# Patient Record
Sex: MALE | Race: WHITE | NOT HISPANIC OR LATINO | Employment: STUDENT | URBAN - METROPOLITAN AREA
[De-identification: names, ages, dates, MRNs, and addresses within clinical notes are randomized per-mention and may not be internally consistent; named-entity substitution may affect disease eponyms.]

---
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
## 2021-04-26 ENCOUNTER — NURSE TRIAGE (OUTPATIENT)
Dept: NURSING | Facility: CLINIC | Age: 21
End: 2021-04-26

## 2021-04-26 NOTE — TELEPHONE ENCOUNTER
Needs shot #1 info to proceed. Lot and date of Moderna shot to proceed with order.    Mercy Gotti RN  [facility] Nurse Advisor

## 2021-05-11 ENCOUNTER — VIRTUAL VISIT (OUTPATIENT)
Dept: URGENT CARE | Facility: CLINIC | Age: 21
End: 2021-05-11
Payer: COMMERCIAL

## 2021-05-11 DIAGNOSIS — Z23 NEED FOR VACCINATION: Primary | ICD-10-CM

## 2021-05-11 PROCEDURE — 99212 OFFICE O/P EST SF 10 MIN: CPT

## 2021-05-20 ENCOUNTER — IMMUNIZATION (OUTPATIENT)
Dept: NURSING | Facility: CLINIC | Age: 21
End: 2021-05-20
Attending: PHYSICIAN ASSISTANT
Payer: COMMERCIAL

## 2021-05-20 DIAGNOSIS — Z23 NEED FOR VACCINATION: ICD-10-CM

## 2021-05-20 PROCEDURE — 91301 PR COVID VAC MODERNA 100 MCG/0.5 ML IM: CPT

## 2021-05-20 PROCEDURE — 0011A PR COVID VAC MODERNA 100 MCG/0.5 ML IM: CPT

## 2021-09-19 ENCOUNTER — HEALTH MAINTENANCE LETTER (OUTPATIENT)
Age: 21
End: 2021-09-19

## 2022-01-08 ENCOUNTER — HEALTH MAINTENANCE LETTER (OUTPATIENT)
Age: 22
End: 2022-01-08

## 2022-05-25 ENCOUNTER — TELEPHONE (OUTPATIENT)
Dept: ENDOCRINOLOGY | Facility: CLINIC | Age: 22
End: 2022-05-25
Payer: COMMERCIAL

## 2022-05-25 NOTE — TELEPHONE ENCOUNTER
Requested that the last 2 years of labs be faxed to [facility] per provider's request.    Thank you,  ARTURO Craft

## 2022-05-25 NOTE — PROGRESS NOTES
Pediatric Endocrinology Follow-up Consultation    Patient: Jf Chapman MRN# [id number]   YOB: 2000 Age: 21year 10month old   Date of Visit: May 27, 2022    Dear Dr. Varun Arellano:    I had the pleasure of seeing your patient, Jf Chapman in the Pediatric Endocrinology Clinic, [facility], on May 27, 2022 for a follow-up consultation of Klinefelter syndrome.           Problem list:     Patient Active Problem List    Diagnosis Date Noted     Adolescent idiopathic scoliosis of thoracic region 03/27/2019     Priority: Medium     Other secondary scoliosis, thoracolumbar region 09/21/2018     Priority: Medium     Klinefelter syndrome 12/09/2013     Priority: Medium          HPI:     Jf is a 21 year old male with a past medical history significant for Klinefelter syndrome who comes for a follow-up evaluation.    Jf's last visit was on 6/21/2019 (Dr. Nettles). Jf reports intermittent changes with his energy levels. Reports that he has had 2-3 episodes in the past 6 months where he is full of energy for 1 week, and then on the next week he is drained and exhausted. Denies noticing any palpitations or other signs of hypo or hyperthyroidism. No headaches or vision changes noted during these episodes. No hallucinations noted. Denies any SI or self harm thoughts.    Review of his chart shows that Jf's last lab check (2020) showed testosterone levels that have remained in a normal range and he did not have significant leydig cell or sertoli cell failure at that time.     Jf underwent surgery for a revision with fresh osteoarticular allograft to the lateral femoral condyle in the fall of 2019.    No concerns.  He is not feeling run down or fatigued. No cold intoelrance, no dry skin.  No constipation. No further palpitations. He reports achieving erections and normal libido. Sexually active. No symptoms of hypothyroidism.  No polyuria/polydipsia.  No  "fractures.  Taking Sertraline and Atarax PRN for anxiety.      Jf had seen Dr. Bo in urology in 2019, but at the time Jf was not very interested in any sperm harvesting at the time. Also, because he was underage, they could not proceed with the procedure. Has not seen him since.    History was obtained from patient and patient's mother.          Social History:     Social History     Social History Narrative     Not on file     Jf is attending college. Wants to be a physical therapist.          Family History:   No family history on file.    Family history was reviewed and is unchanged. Refer to the initial note.         Allergies:   No Known Allergies          Medications:     No current outpatient medications on file.          Review of Systems:   Gen: Negative  Eye: Negative  ENT: Negative  Pulmonary:  Negative  Cardio: no chest pains or swellings  Gastrointestinal: No constipation  Hematologic: Negative  Genitourinary: Negative - no polyuria/polydipsia  Musculoskeletal: Negative  Psychiatric: Negative  Neurologic: Negative  Skin: Negative  Endocrine: see HPI.            Physical Exam:   Blood pressure 106/73, pulse 72, height 1.959 m (6' 5.13\"), weight 88 kg (194 lb 0.1 oz).  Growth percentile SmartLinks can only be used for patients less than 20 years old.  Height: 195.9 cm  (75.87\") Facility age limit for growth percentiles is 20 years.  Weight: 88 kg (actual weight), Facility age limit for growth percentiles is 20 years.  BMI: Body mass index is 22.93 kg/m . Facility age limit for growth percentiles is 20 years.      Constitutional: awake, alert, cooperative, no apparent distress  Eyes: Lids and lashes normal, sclera clear, conjunctiva normal  ENT: Normocephalic, without obvious abnormality, OP clear  Neck:  thyroid symmetric, not enlarged and no tenderness, no facial hair  Hematologic / Lymphatic: no cervical lymphadenopathy  Lungs: No increased work of breathing, clear to auscultation bilaterally " with good air entry.  Cardiovascular: Regular rate and rhythm, no murmurs.  Abdomen: No scars, normal bowel sounds, soft, non-distended, non-tender, no masses palpated, no hepatosplenomegaly  Genitourinary:  Silverio V male  Musculoskeletal: There is no redness, warmth, or swelling of the joints.    Neurologic: Normal DTR  Neuropsychiatric: normal  Skin: no lesions, facial hair present        Laboratory results:      Latest Reference Range & Units 07/14/20 00:00   Cholesterol (External) <=200 mg/dL 189 (E)   FSH  21.0 (E)   HDL Cholesterol (External) >=40 mg/dL 29 (L) (E)   LDL-Cholesterol (External) <=100 mg/dL 144 (H) (E)   LH mIU/mL 27.8 (E)   T4 Free ng/dL 1.40 (E)   Testosterone Total (External) ng/dL 540 (E)   Triglycerides (External) <=150 mg/dL 78 (E)   TSH mcU/mL 1.29 (E)   Scan Lab Results (External)  multiple results see scan (E) [1]   (L): Data is abnormally low  (H): Data is abnormally high  (E): External lab result  [1] U Metanephrine         Assessment and Plan:     Jf is a 21 year old male with a past medical history significant for Klinefelter syndrome seen today for a follow-up. Jf appears clinically euthyroid. He does not have any signs or symptoms concerning for diabetes. He has not had evidence for significant leydig cell dysfunction to this point and no other co-morbities. By clinical history, he does not have symptoms of androgen deficiency.  He is due for repeat screening tests as noted below.     Plan:  - Reviewed previous lab results  - Labs as ordered.  - No medications ordered  - Reviewed plans for transfer to adult care; due to his age I would be happy to see him 1 more time before transferring to adult endo. Will place a referral today to get him in the schedule.  - Jf will contact Dr. Bo's office to review options available now that he is an adult.     Orders Placed This Encounter   Procedures     Testosterone total     T4 free     TSH     Luteinizing Hormone Pediatric     FSH      Lipid Profile     Hemoglobin A1c     Vitamin D 25-Hydroxy     Basic metabolic panel     Adult Endocrinology  Referral     Thank you for allowing me to participate in the care of Jf.  Please do not hesitate to call with questions or concerns.    Sincerely,    Amor Portillo MD  Pediatric Endocrinology  [facility]  Phone: [phone]  Fax: [phone]     Total time including review of medical records, history, physical examination, counselling, and coordination of care concerning one or more of the diagnoses listed under the assessment and plan took 30 minutes. I counseled the patient and family about the nature of the condition(s), options of therapy. All parent questions were answered and parent(s) understood and agreed with the plan.     CC    Patient Care Team:  Varun Arellano MD as PCP - General (Pediatrics)  Aaron Whitten MD as MD (Pediatric Surgery)  Jaden Calzada MD as MD (Orthopedics)  Keeley Welsh, RN as Specialty Care Coordinator (Orthopedics)  Mj Nettles MD as MD (Pediatrics)   Copy to patient  EDNA PUGA EDD, YASH  [address] [facility] [id number]

## 2022-05-27 ENCOUNTER — LAB (OUTPATIENT)
Dept: LAB | Facility: CLINIC | Age: 22
End: 2022-05-27
Attending: PEDIATRICS
Payer: COMMERCIAL

## 2022-05-27 ENCOUNTER — OFFICE VISIT (OUTPATIENT)
Dept: PEDIATRICS | Facility: CLINIC | Age: 22
End: 2022-05-27
Attending: PEDIATRICS
Payer: COMMERCIAL

## 2022-05-27 ENCOUNTER — TRANSFERRED RECORDS (OUTPATIENT)
Dept: HEALTH INFORMATION MANAGEMENT | Facility: CLINIC | Age: 22
End: 2022-05-27

## 2022-05-27 VITALS
BODY MASS INDEX: 22.91 KG/M2 | WEIGHT: 194 LBS | DIASTOLIC BLOOD PRESSURE: 73 MMHG | HEIGHT: 77 IN | HEART RATE: 72 BPM | SYSTOLIC BLOOD PRESSURE: 106 MMHG

## 2022-05-27 DIAGNOSIS — Z91.89 AT RISK FOR ALTERATION IN ENDOCRINE FUNCTION: ICD-10-CM

## 2022-05-27 DIAGNOSIS — Q98.4 KLINEFELTER SYNDROME: Primary | ICD-10-CM

## 2022-05-27 DIAGNOSIS — Q98.4 KLINEFELTER SYNDROME: ICD-10-CM

## 2022-05-27 LAB
ANION GAP SERPL CALCULATED.3IONS-SCNC: 3 MMOL/L (ref 3–14)
BUN SERPL-MCNC: 13 MG/DL (ref 7–30)
CALCIUM SERPL-MCNC: 9.7 MG/DL (ref 8.5–10.1)
CHLORIDE BLD-SCNC: 103 MMOL/L (ref 94–109)
CHOLEST SERPL-MCNC: 165 MG/DL
CO2 SERPL-SCNC: 31 MMOL/L (ref 20–32)
CREAT SERPL-MCNC: 1.11 MG/DL (ref 0.66–1.25)
DEPRECATED CALCIDIOL+CALCIFEROL SERPL-MC: 78 UG/L (ref 20–75)
FASTING STATUS PATIENT QL REPORTED: YES
FSH SERPL-ACNC: 18.7 IU/L (ref 0.7–10.8)
GFR SERPL CREATININE-BSD FRML MDRD: >90 ML/MIN/1.73M2
GLUCOSE BLD-MCNC: 91 MG/DL (ref 70–99)
HBA1C MFR BLD: 5.1 % (ref 0–5.6)
HDLC SERPL-MCNC: 35 MG/DL
LDLC SERPL CALC-MCNC: 110 MG/DL
NONHDLC SERPL-MCNC: 130 MG/DL
POTASSIUM BLD-SCNC: 3.6 MMOL/L (ref 3.4–5.3)
SODIUM SERPL-SCNC: 137 MMOL/L (ref 133–144)
T4 FREE SERPL-MCNC: 0.76 NG/DL (ref 0.76–1.46)
TRIGL SERPL-MCNC: 101 MG/DL
TSH SERPL DL<=0.005 MIU/L-ACNC: 1.4 MU/L (ref 0.4–4)

## 2022-05-27 PROCEDURE — G0463 HOSPITAL OUTPT CLINIC VISIT: HCPCS

## 2022-05-27 PROCEDURE — 99214 OFFICE O/P EST MOD 30 MIN: CPT | Performed by: PEDIATRICS

## 2022-05-27 PROCEDURE — 82306 VITAMIN D 25 HYDROXY: CPT

## 2022-05-27 PROCEDURE — 83036 HEMOGLOBIN GLYCOSYLATED A1C: CPT

## 2022-05-27 PROCEDURE — 84443 ASSAY THYROID STIM HORMONE: CPT

## 2022-05-27 PROCEDURE — 84439 ASSAY OF FREE THYROXINE: CPT

## 2022-05-27 PROCEDURE — 80048 BASIC METABOLIC PNL TOTAL CA: CPT

## 2022-05-27 PROCEDURE — 83002 ASSAY OF GONADOTROPIN (LH): CPT

## 2022-05-27 PROCEDURE — 80061 LIPID PANEL: CPT

## 2022-05-27 PROCEDURE — 83001 ASSAY OF GONADOTROPIN (FSH): CPT

## 2022-05-27 PROCEDURE — 36415 COLL VENOUS BLD VENIPUNCTURE: CPT

## 2022-05-27 PROCEDURE — 84403 ASSAY OF TOTAL TESTOSTERONE: CPT

## 2022-05-27 RX ORDER — SERTRALINE HYDROCHLORIDE 100 MG/1
100 TABLET, FILM COATED ORAL DAILY
COMMUNITY
Start: 2022-03-22 | End: 2023-05-26

## 2022-05-27 RX ORDER — HYDROXYZINE HYDROCHLORIDE 25 MG/1
25 TABLET, FILM COATED ORAL 3 TIMES DAILY PRN
COMMUNITY
End: 2023-05-26

## 2022-05-27 ASSESSMENT — PAIN SCALES - GENERAL: PAINLEVEL: NO PAIN (0)

## 2022-05-27 NOTE — NURSING NOTE
"Informant-    Jf is accompanied by mother    Reason for Visit-  Klinefelter syndrome    Vitals signs-  /73   Pulse 72   Ht 1.959 m (6' 5.13\")   Wt 88 kg (194 lb 0.1 oz)   BMI 22.93 kg/m      There are concerns about the child's exposure to violence in the home: No    Face to Face time: 5 minutes  Valarie Chappell MA      Peds Outpatient BP  1) Rested for 5 minutes, BP taken on bare arm, patient sitting (or supine for infants) w/ legs uncrossed?   Yes  2) Right arm used?      Yes  3) Arm circumference of largest part of upper arm (in cm): 32  4) BP cuff sized used: Large Adult (32-43cm)   If used different size cuff then what was recommended why? N/A  5) First BP reading:machine   BP Readings from Last 1 Encounters:   05/27/22 106/73      Is reading >90%?No   (90% for <1 years is 90/50)  (90% for >18 years is 140/90)  *If a machine BP is at or above 90% take manual BP  6) Manual BP reading: N/A  7) Other comments: None    Valarie Chappell MA.          "

## 2022-06-01 LAB — TESTOST SERPL-MCNC: 461 NG/DL (ref 240–950)

## 2022-06-08 LAB — LUTROPIN (EXTERNAL): 18 MIU/ML

## 2022-09-07 NOTE — PROGRESS NOTES
Discharge Note    Progress reporting period is from last progress note on 12/26/18 to Jan 30, 2019.    Jf failed to follow up and current status is unknown.  Please see information below for last relevant information on current status.  Patient seen for 10 visits.    SUBJECTIVE  Subjective changes noted by patient:  Has played basketball twice and his knee felt fine.  Played limited time in the games.    .  Current pain level is  .     Previous pain level was   .   Changes in function:  Yes (See Goal flowsheet attached for changes in current functional level)  Adverse reaction to treatment or activity: None    OBJECTIVE  Changes noted in objective findings: Minimal swelling in knee.  Quad strength improving.  SL jumps with obvious weakness.     ASSESSMENT/PLAN  Diagnosis: s/p hardware removal for right knee OCD lesion with ORIF   Updated problem list and treatment plan:     STG/LTGs have been met or progress has been made towards goals:  Yes, please see goal flowsheet for most current information  Assessment of Progress: current status is unknown.    Last current status: Pt is progressing well   Self Management Plans:  HEP  I have re-evaluated this patient and find that the nature, scope, duration and intensity of the therapy is appropriate for the medical condition of the patient.  Jf continues to require the following intervention to meet STG and LTG's:  HEP.    Recommendations:  Discharge with current home program.  Patient to follow up with MD as needed.    Please refer to the daily flowsheet for treatment today, total treatment time and time spent performing 1:1 timed codes.         no

## 2022-11-20 ENCOUNTER — HEALTH MAINTENANCE LETTER (OUTPATIENT)
Age: 22
End: 2022-11-20

## 2023-04-15 ENCOUNTER — HEALTH MAINTENANCE LETTER (OUTPATIENT)
Age: 23
End: 2023-04-15

## 2023-05-26 ENCOUNTER — LAB (OUTPATIENT)
Dept: LAB | Facility: CLINIC | Age: 23
End: 2023-05-26
Attending: PEDIATRICS
Payer: COMMERCIAL

## 2023-05-26 ENCOUNTER — OFFICE VISIT (OUTPATIENT)
Dept: PEDIATRICS | Facility: CLINIC | Age: 23
End: 2023-05-26
Attending: PEDIATRICS
Payer: COMMERCIAL

## 2023-05-26 VITALS
WEIGHT: 197.31 LBS | HEIGHT: 77 IN | RESPIRATION RATE: 22 BRPM | DIASTOLIC BLOOD PRESSURE: 72 MMHG | BODY MASS INDEX: 23.3 KG/M2 | SYSTOLIC BLOOD PRESSURE: 125 MMHG | HEART RATE: 71 BPM

## 2023-05-26 DIAGNOSIS — Q98.4 KLINEFELTER SYNDROME: ICD-10-CM

## 2023-05-26 DIAGNOSIS — Q98.4 KLINEFELTER SYNDROME: Primary | ICD-10-CM

## 2023-05-26 LAB
FSH SERPL IRP2-ACNC: 23.1 MIU/ML (ref 1.5–12.4)
HBA1C MFR BLD: 5.4 %
LH SERPL-ACNC: 27.2 MIU/ML (ref 1.7–8.6)
TSH SERPL DL<=0.005 MIU/L-ACNC: 1.92 UIU/ML (ref 0.3–4.2)

## 2023-05-26 PROCEDURE — 83002 ASSAY OF GONADOTROPIN (LH): CPT

## 2023-05-26 PROCEDURE — 83001 ASSAY OF GONADOTROPIN (FSH): CPT

## 2023-05-26 PROCEDURE — 84443 ASSAY THYROID STIM HORMONE: CPT

## 2023-05-26 PROCEDURE — 84403 ASSAY OF TOTAL TESTOSTERONE: CPT

## 2023-05-26 PROCEDURE — G0463 HOSPITAL OUTPT CLINIC VISIT: HCPCS | Performed by: PEDIATRICS

## 2023-05-26 PROCEDURE — 83036 HEMOGLOBIN GLYCOSYLATED A1C: CPT

## 2023-05-26 PROCEDURE — 99214 OFFICE O/P EST MOD 30 MIN: CPT | Performed by: PEDIATRICS

## 2023-05-26 PROCEDURE — 36415 COLL VENOUS BLD VENIPUNCTURE: CPT

## 2023-05-26 NOTE — LETTER
5/26/2023      RE: Jf Chapman  [address] [facility] [id number]       Pediatric Endocrinology Follow-up Consultation    Patient: Jf Chapman MRN# [id number]   YOB: 2000 Age: 22year 10month old   Date of Visit: May 26, 2023    Dear Dr. Varun Arellano:    I had the pleasure of seeing your patient, Jf Chapman in the Pediatric Endocrinology Clinic, [facility], on May 26, 2023 for a follow-up consultation of Klinefelter syndrome.           Problem list:     Patient Active Problem List    Diagnosis Date Noted     Adolescent idiopathic scoliosis of thoracic region 03/27/2019     Priority: Medium     Other secondary scoliosis, thoracolumbar region 09/21/2018     Priority: Medium     Klinefelter syndrome 12/09/2013     Priority: Medium          HPI:     Jf is a 21 year old male with a past medical history significant for Klinefelter syndrome who comes for a follow-up evaluation.    Jf's last visit was on 6/21/2022 (Dr. Ken).  Feels like his energy levels are lacking somewhat.  Some days feels more tired than others.  Has noted that he is not getting erections in the morning less often.  Libido is there but feels like he gets less spontaneous erections.      Review of his chart shows that Jf's last lab check (2022) showed testosterone levels that have remained in a normal range and he did not have significant leydig cell or sertoli cell failure at that time.     He is lowering his zoloft.  Moved Zoloft from 100 to 50 mg.  He started about two years ago for depression.  Hoping he can wean off.  So far is doing well.  Feels like he has experienced a big improvement from that standpoint.    No diabetes mellitus symptoms. He was taking vitamin D previously but is not anymore.      Jf had seen Dr. Bo in urology in 2019, but at the time Jf was not very interested in any sperm harvesting at the time. Also, because he was underage, they could  "not proceed with the procedure. Has not seen him since.    History was obtained from patient and patient's mother.          Social History:     Social History     Social History Narrative     Not on file     Entering 4th year at [employer] this fall  Plans on attending PT school following graduation.         Family History:   No family history on file.    Family history was reviewed and is unchanged. Refer to the initial note.         Allergies:   No Known Allergies          Medications:     Current Outpatient Medications   Medication Sig Dispense Refill     sertraline (ZOLOFT) 50 MG tablet             Review of Systems:   Gen: Negative  Eye: Negative  ENT: Negative  Pulmonary:  Negative  Cardio: no chest pains or swellings  Gastrointestinal: No constipation  Hematologic: Negative  Genitourinary: Negative - no polyuria/polydipsia  Musculoskeletal: Negative  Psychiatric: Negative  Neurologic: Negative  Skin: Negative  Endocrine: see HPI.            Physical Exam:   Blood pressure 125/72, pulse 71, resp. rate 22, height 1.956 m (6' 5.01\"), weight 89.5 kg (197 lb 5 oz).  Growth %ile SmartLinks can only be used for patients less than 20 years old.  Height: 195.6 cm  (75.87\") Facility age limit for growth %debbi is 20 years.  Weight: 89.5 kg (actual weight), Facility age limit for growth %debbi is 20 years.  BMI: Body mass index is 23.39 kg/m . Facility age limit for growth %debbi is 20 years.      Constitutional: awake, alert, cooperative, no apparent distress  Eyes: Lids and lashes normal, sclera clear, conjunctiva normal  ENT: Normocephalic, without obvious abnormality, OP clear  Neck:  thyroid symmetric, not enlarged and no tenderness  Hematologic / Lymphatic: no cervical lymphadenopathy  Lungs: No increased work of breathing, clear to auscultation bilaterally with good air entry.  Cardiovascular: Regular rate and rhythm, no murmurs.  Abdomen: No scars, normal bowel sounds, soft, non-distended, non-tender, no masses " palpated, no hepatosplenomegaly  Genitourinary:  deferred  Musculoskeletal: There is no redness, warmth, or swelling of the joints.    Neurologic: Normal DTR  Neuropsychiatric: normal  Skin: no lesions, full facial hair present        Laboratory results:      Latest Reference Range & Units 05/27/22 12:16   Sodium 133 - 144 mmol/L 137   Potassium 3.4 - 5.3 mmol/L 3.6   Chloride 94 - 109 mmol/L 103   Carbon Dioxide 20 - 32 mmol/L 31   Urea Nitrogen 7 - 30 mg/dL 13   Creatinine 0.66 - 1.25 mg/dL 1.11   GFR Estimate >60 mL/min/1.73m2 >90   Calcium 8.5 - 10.1 mg/dL 9.7   Anion Gap 3 - 14 mmol/L 3   Cholesterol <200 mg/dL 165   Patient Fasting?  Yes   FSH 0.7 - 10.8 IU/L 18.7 (H)   HDL Cholesterol >=40 mg/dL 35 (L)   Hemoglobin A1C 0.0 - 5.6 % 5.1   LDL Cholesterol Calculated <=100 mg/dL 110 (H)   Non HDL Cholesterol <130 mg/dL 130 (H)   T4 Free 0.76 - 1.46 ng/dL 0.76   Testosterone Total 240 - 950 ng/dL 461   Triglycerides <150 mg/dL 101   TSH 0.40 - 4.00 mU/L 1.40   Vitamin D Deficiency screening 20 - 75 ug/L 78 (H)   Glucose 70 - 99 mg/dL 91   Lutropin (External) miu/ml 18   (H): Data is abnormally high  (L): Data is abnormally low         Assessment and Plan:     Jf is a 22 year old male with a past medical history significant for Klinefelter syndrome without previous evidence for leydig cell failure requiring replacement therapy.  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  I have asked that his labs are repeated along with some additional screening tests.  Given Jf's age, I think it would be appropriate for him to transition to adult endocrinology for follow-up.      Orders Placed This Encounter   Procedures     Testosterone total     Luteinizing Hormone     Follicle stimulating hormone     Hemoglobin A1c     TSH with free T4 reflex     Adult Endocrinology  Referral     Patient Instructions   1.  Labs today  2. Will contact you with results via [place] and whether it makes  sense to consider starting hormone treatment  3. Otherwise, should plan on establishing care with adult endocrinology next summer.  Referral placed.  In the interim, contacct me with any questions, concerns or desire to have labs repeated.    Thank you for allowing me to participate in the care of Jf.  Please do not hesitate to call with questions or concerns.    Sincerely,      Mj Nettles MD    Pager [phone]      CC    Patient Care Team:  Varun Arellano MD as PCP - General (Pediatrics)  Aaron Whitten MD as MD (Pediatric Surgery)  Jaden Calzada MD as MD (Orthopedics)  Keeley Welsh, ARTURO as Specialty Care Coordinator (Orthopedics)  Mj Nettles MD as MD (Pediatrics)  Amor Ornelas MD as Assigned Pediatric Specialist Provider   Copy to patient  EDNA PUGA, YASH  [address] [facility] [id number]             Mj Nettles MD

## 2023-05-26 NOTE — PROGRESS NOTES
Pediatric Endocrinology Follow-up Consultation    Patient: Jf Chapman MRN# [id number]   YOB: 2000 Age: 22year 10month old   Date of Visit: May 26, 2023    Dear Dr. Varun Arellano:    I had the pleasure of seeing your patient, Jf Chapman in the Pediatric Endocrinology Clinic, [facility], on May 26, 2023 for a follow-up consultation of Klinefelter syndrome.           Problem list:     Patient Active Problem List    Diagnosis Date Noted     Adolescent idiopathic scoliosis of thoracic region 03/27/2019     Priority: Medium     Other secondary scoliosis, thoracolumbar region 09/21/2018     Priority: Medium     Klinefelter syndrome 12/09/2013     Priority: Medium          HPI:     Jf is a 21 year old male with a past medical history significant for Klinefelter syndrome who comes for a follow-up evaluation.    Jf's last visit was on 6/21/2022 (Dr. Ken).  Feels like his energy levels are lacking somewhat.  Some days feels more tired than others.  Has noted that he is not getting erections in the morning less often.  Libido is there but feels like he gets less spontaneous erections.      Review of his chart shows that Jf's last lab check (2022) showed testosterone levels that have remained in a normal range and he did not have significant leydig cell or sertoli cell failure at that time.     He is lowering his zoloft.  Moved Zoloft from 100 to 50 mg.  He started about two years ago for depression.  Hoping he can wean off.  So far is doing well.  Feels like he has experienced a big improvement from that standpoint.    No diabetes mellitus symptoms. He was taking vitamin D previously but is not anymore.      Jf had seen Dr. Bo in urology in 2019, but at the time Jf was not very interested in any sperm harvesting at the time. Also, because he was underage, they could not proceed with the procedure. Has not seen him since.    History was obtained  "from patient and patient's mother.          Social History:     Social History     Social History Narrative     Not on file     Entering 4th year at [employer] this fall  Plans on attending PT school following graduation.         Family History:   No family history on file.    Family history was reviewed and is unchanged. Refer to the initial note.         Allergies:   No Known Allergies          Medications:     Current Outpatient Medications   Medication Sig Dispense Refill     sertraline (ZOLOFT) 50 MG tablet             Review of Systems:   Gen: Negative  Eye: Negative  ENT: Negative  Pulmonary:  Negative  Cardio: no chest pains or swellings  Gastrointestinal: No constipation  Hematologic: Negative  Genitourinary: Negative - no polyuria/polydipsia  Musculoskeletal: Negative  Psychiatric: Negative  Neurologic: Negative  Skin: Negative  Endocrine: see HPI.            Physical Exam:   Blood pressure 125/72, pulse 71, resp. rate 22, height 1.956 m (6' 5.01\"), weight 89.5 kg (197 lb 5 oz).  Growth %ile SmartLinks can only be used for patients less than 20 years old.  Height: 195.6 cm  (75.87\") Facility age limit for growth %debbi is 20 years.  Weight: 89.5 kg (actual weight), Facility age limit for growth %debbi is 20 years.  BMI: Body mass index is 23.39 kg/m . Facility age limit for growth %debbi is 20 years.      Constitutional: awake, alert, cooperative, no apparent distress  Eyes: Lids and lashes normal, sclera clear, conjunctiva normal  ENT: Normocephalic, without obvious abnormality, OP clear  Neck:  thyroid symmetric, not enlarged and no tenderness  Hematologic / Lymphatic: no cervical lymphadenopathy  Lungs: No increased work of breathing, clear to auscultation bilaterally with good air entry.  Cardiovascular: Regular rate and rhythm, no murmurs.  Abdomen: No scars, normal bowel sounds, soft, non-distended, non-tender, no masses palpated, no hepatosplenomegaly  Genitourinary:  deferred  Musculoskeletal: " There is no redness, warmth, or swelling of the joints.    Neurologic: Normal DTR  Neuropsychiatric: normal  Skin: no lesions, full facial hair present        Laboratory results:      Latest Reference Range & Units 05/27/22 12:16   Sodium 133 - 144 mmol/L 137   Potassium 3.4 - 5.3 mmol/L 3.6   Chloride 94 - 109 mmol/L 103   Carbon Dioxide 20 - 32 mmol/L 31   Urea Nitrogen 7 - 30 mg/dL 13   Creatinine 0.66 - 1.25 mg/dL 1.11   GFR Estimate >60 mL/min/1.73m2 >90   Calcium 8.5 - 10.1 mg/dL 9.7   Anion Gap 3 - 14 mmol/L 3   Cholesterol <200 mg/dL 165   Patient Fasting?  Yes   FSH 0.7 - 10.8 IU/L 18.7 (H)   HDL Cholesterol >=40 mg/dL 35 (L)   Hemoglobin A1C 0.0 - 5.6 % 5.1   LDL Cholesterol Calculated <=100 mg/dL 110 (H)   Non HDL Cholesterol <130 mg/dL 130 (H)   T4 Free 0.76 - 1.46 ng/dL 0.76   Testosterone Total 240 - 950 ng/dL 461   Triglycerides <150 mg/dL 101   TSH 0.40 - 4.00 mU/L 1.40   Vitamin D Deficiency screening 20 - 75 ug/L 78 (H)   Glucose 70 - 99 mg/dL 91   Lutropin (External) miu/ml 18   (H): Data is abnormally high  (L): Data is abnormally low         Assessment and Plan:     Jf is a 22 year old male with a past medical history significant for Klinefelter syndrome without previous evidence for leydig cell failure requiring replacement therapy.  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  I have asked that his labs are repeated along with some additional screening tests.  Given Jf's age, I think it would be appropriate for him to transition to adult endocrinology for follow-up.      Orders Placed This Encounter   Procedures     Testosterone total     Luteinizing Hormone     Follicle stimulating hormone     Hemoglobin A1c     TSH with free T4 reflex     Adult Endocrinology  Referral     Patient Instructions   1.  Labs today  2. Will contact you with results via [employer] and whether it makes sense to consider starting hormone treatment  3. Otherwise, should plan on  establishing care with adult endocrinology next summer.  Referral placed.  In the interim, contacct me with any questions, concerns or desire to have labs repeated.    Thank you for allowing me to participate in the care of Jf.  Please do not hesitate to call with questions or concerns.    Sincerely,      Mj Nettles MD    Pager [phone]      CC    Patient Care Team:  Varun Arellano MD as PCP - General (Pediatrics)  Aaron Whitten MD as MD (Pediatric Surgery)  Jaden Calzada MD as MD (Orthopedics)  Keeley Welsh, ARTURO as Specialty Care Coordinator (Orthopedics)  Mj Nettles MD as MD (Pediatrics)  Amor Ornelas MD as Assigned Pediatric Specialist Provider   Copy to patient  EDNA PUGA, YASH  [address] [address] [facility] [id number]

## 2023-05-26 NOTE — PATIENT INSTRUCTIONS
Labs today  Will contact you with results via [employer] and whether it makes sense to consider starting hormone treatment  Otherwise, should plan on establishing care with adult endocrinology next summer.  Referral placed.  In the interim, contacct me with any questions, concerns or desire to have labs repeated.

## 2023-05-26 NOTE — NURSING NOTE
"Informant-    Jf is accompanied by mother    Reason for Visit-  Klinefelter syndrome      Vitals signs-  Ht 1.956 m (6' 5.01\")   Wt 89.5 kg (197 lb 5 oz)   BMI 23.39 kg/m      There are concerns about the child's exposure to violence in the home: No    Need Flu Shot: No    Need MyChart: No    Does the patient need any medication refills today? No    Face to Face time: 5 Minutes  Deepa Haque MA      "

## 2023-05-31 LAB — TESTOST SERPL-MCNC: 417 NG/DL (ref 240–950)

## 2023-09-20 NOTE — PROGRESS NOTES
Jf Chapman is a 20 year old male who is being evaluated via a billable telephone/video visit.    What phone number would you like to be contacted at? [phone]  How would you like to obtain your AVS? Mail a copy    Subjective   Jf Chapman is a 20 year old male who presents today via telephone/video for the following health issues  accompanied by his mother:  HPI   Concern - needs order for 2nd covid vaccine  Onset: 2weeks ago  Description:  Received his 1st Moderna covid vaccine dose on 4/17 in [place].  He is due for his 2nd dose on 5/15.  Needs order for 2nd covid dose.  Intensity: none  Progression of Symptoms:  same  Accompanying Signs & Symptoms: No URI symptoms, abdominal pain, n/v, constipation, diarrhea, bloody or black tarry stools.  No fever, chills or sweats.  No loss of smell or taste.  Previous history of similar problem: no other vaccines since then  Precipitating factors:        Worsened by: none  Alleviating factors:        Improved by: none  Therapies tried and outcome:  none     Patient Active Problem List   Diagnosis     Klinefelter syndrome     Other secondary scoliosis, thoracolumbar region     Adolescent idiopathic scoliosis of thoracic region     No current outpatient medications on file.     No current facility-administered medications for this visit.      No Known Allergies    Review of Systems   CONSTITUTIONAL: NEGATIVE for fever, chills, change in weight  INTEGUMENTARY/SKIN: NEGATIVE for worrisome rashes, moles or lesions  EYES: NEGATIVE for vision changes or irritation  CV: NEGATIVE for chest pain, palpitations or peripheral edema  GI: NEGATIVE for nausea, abdominal pain, heartburn, or change in bowel habits  MUSCULOSKELETAL: NEGATIVE for significant arthralgias or myalgia  NEURO: NEGATIVE for weakness, dizziness or paresthesias      Objective    Vitals:  No vitals were obtained today due to virtual visit.  Physical Exam   healthy, alert, no distress, and cooperative  PSYCH:  Alert and oriented times 3; coherent speech, normal   rate and volume, able to articulate logical thoughts, able   to abstract reason, no tangential thoughts, no hallucinations   or delusions  Her affect is normal and pleasant  RESP: No cough, no audible wheezing, able to talk in full sentences  Remainder of exam unable to be completed due to telephone visits      Assessment/Plan:  Need for vaccination:  [place] 1st Moderna dose in [place] on 4/17 and will send for his 2nd dose on 5/15.  No contraindications to vaccination at this time.  Recheck in clinic if symptoms worsen or if symptoms do not improve.   -     MODERNA COVID-19 VACCINE 2ND DOSE APPT; Future          Arlin See VIVIANA Aranda      Phone call duration: 10 minutes     coffee/tea/pop/soda

## 2024-01-23 ENCOUNTER — HOSPITAL ENCOUNTER (OUTPATIENT)
Dept: CARDIOLOGY | Facility: CLINIC | Age: 24
Discharge: HOME | End: 2024-01-23
Attending: INTERNAL MEDICINE | Admitting: INTERNAL MEDICINE
Payer: COMMERCIAL

## 2024-01-23 DIAGNOSIS — I05.9 MITRAL VALVE DISORDER: ICD-10-CM

## 2024-01-23 DIAGNOSIS — Q98.4 KLINEFELTER SYNDROME: ICD-10-CM

## 2024-01-23 LAB — LVEF ECHO: NORMAL

## 2024-01-23 PROCEDURE — 999N000208 ECHOCARDIOGRAM COMPLETE

## 2024-01-23 PROCEDURE — 93306 TTE W/DOPPLER COMPLETE: CPT | Mod: 26 | Performed by: INTERNAL MEDICINE

## 2024-01-23 PROCEDURE — 255N000002 HC RX 255 OP 636: Performed by: INTERNAL MEDICINE

## 2024-01-23 PROCEDURE — C8929 TTE W OR WO FOL WCON,DOPPLER: HCPCS

## 2024-01-23 RX ADMIN — HUMAN ALBUMIN MICROSPHERES AND PERFLUTREN 3 ML: 10; .22 INJECTION, SOLUTION INTRAVENOUS at 12:57

## 2024-01-24 ENCOUNTER — OFFICE VISIT (OUTPATIENT)
Dept: CARDIOLOGY | Facility: CLINIC | Age: 24
End: 2024-01-24
Payer: COMMERCIAL

## 2024-01-24 VITALS
SYSTOLIC BLOOD PRESSURE: 114 MMHG | HEART RATE: 64 BPM | HEIGHT: 77 IN | BODY MASS INDEX: 23.89 KG/M2 | DIASTOLIC BLOOD PRESSURE: 73 MMHG | WEIGHT: 202.3 LBS

## 2024-01-24 DIAGNOSIS — I05.9 MITRAL VALVE DISORDER: Primary | ICD-10-CM

## 2024-01-24 PROCEDURE — 93000 ELECTROCARDIOGRAM COMPLETE: CPT | Performed by: INTERNAL MEDICINE

## 2024-01-24 PROCEDURE — 99204 OFFICE O/P NEW MOD 45 MIN: CPT | Performed by: INTERNAL MEDICINE

## 2024-01-24 RX ORDER — AMOXICILLIN 500 MG
1200 CAPSULE ORAL DAILY PRN
COMMUNITY
End: 2024-08-26

## 2024-01-24 RX ORDER — VITAMIN B COMPLEX
TABLET ORAL DAILY
COMMUNITY
End: 2024-08-26

## 2024-01-24 NOTE — PROGRESS NOTES
HPI and Plan:   Jf Chapman is a 23 year old male who presents with history of Klinefelter's syndrome, pectus excavatum with reconstruction and concerns about mitral valve disease.  He had a pediatric echocardiogram done in 2018 and the reader suggested that there was mild mitral myxomatous changes at that time and had recommended a follow-up echo in 5 years.  He repeated the echocardiogram this past May, and I did review the images myself.  His mitral valve looks completely normal without evidence of mitral valve prolapse, thickening or myxomatous changes.  LV and RV function are normal no other structural abnormalities are seen.  He feels well denying chest pain, shortness of breath, exercise intolerance.  He does have occasional fluttering in his chest he says has been ongoing for a long time and is not bothersome to him.  His mother is present with him and provides much of his history.  I reviewed lab work done in May as well he has a mixed hyperlipidemia with a total cholesterol 165 HDL 35  and triglycerides 101.  Basic metabolic panel, hemoglobin A1c, thyroid studies are all normal.  Exam is unremarkable today    Summary    1.  Klinefelter syndrome without evidence of cardiac manifestations and normal echocardiogram.    2.  Mixed hyperlipidemia-recommend every 2 to 3-year follow-up labs.  Patient does have an increased risk of ischemic heart disease with underlying Klinefelter's.    Please feel free to contact me with any questions given regards to his care       Today's clinic visit entailed:  Review of external notes as documented elsewhere in note  Review of the result(s) of each unique test - echo, lipids, TSH, BMP  The following tests were independently interpreted by me as noted in my documentation: echo    Provider  Link to [facility] Help Grid     The level of medical decision making during this visit was of moderate complexity.    Orders Placed This Encounter   Procedures    EKG 12-lead complete  w/read - Clinics (performed today)     Orders Placed This Encounter   Medications    Vitamin D3 (CHOLECALCIFEROL) 25 mcg (1000 units) tablet     Sig: Take by mouth daily    Omega-3 Fatty Acids (FISH OIL) 1200 MG capsule     Sig: Take 1,200 mg by mouth daily as needed     There are no discontinued medications.      Encounter Diagnosis   Name Primary?    Mitral valve disorder Yes       CURRENT MEDICATIONS:  Current Outpatient Medications   Medication Sig Dispense Refill    Omega-3 Fatty Acids (FISH OIL) 1200 MG capsule Take 1,200 mg by mouth daily as needed      Vitamin D3 (CHOLECALCIFEROL) 25 mcg (1000 units) tablet Take by mouth daily      sertraline (ZOLOFT) 50 MG tablet  (Patient not taking: Reported on 1/24/2024)         ALLERGIES   No Known Allergies    PAST MEDICAL HISTORY:  Past Medical History:   Diagnosis Date    Klinefelter syndrome 12/9/2013    Pectus excavatum     Scoliosis        PAST SURGICAL HISTORY:  Past Surgical History:   Procedure Laterality Date    GENITOURINARY SURGERY      circumcision    ORTHOPEDIC SURGERY  2018    Right knee surgery with graft    OSTEOCHONDRAL ALLOGRAFT TRANSFER SYSTEM PROCEDURE KNEE Right 10/17/2019    Procedure: RIGHT KNEE OPEN OSTEOCHONDRAL ALLOGRAFT TRANSPLANT TO LATERAL FEMORAL CONDYLE;  Surgeon: Jose Armando Munoz MD;  Location: [facility] OR    REPAIR PECTUS EXCAVATUM      2015:BIOMET PECTUS BAR MRI CONDITIONAL TO 3T;  bar removed in 2019    wisdom teeth         FAMILY HISTORY:  Family History   Problem Relation Age of Onset    Cerebrovascular Disease Maternal Grandmother     Hypertension Maternal Grandmother     Cerebrovascular Disease Maternal Grandfather     CABG Maternal Grandfather        SOCIAL HISTORY:  Social History     Socioeconomic History    Marital status: Single     Spouse name: None    Number of children: None    Years of education: None    Highest education level: None   Tobacco Use    Smoking status: Never    Smokeless tobacco: Never   Substance and Sexual  "Activity    Alcohol use: Not Currently    Drug use: Never    Sexual activity: Not Currently   Other Topics Concern    Parent/sibling w/ CABG, MI or angioplasty before 65F 55M? No       Review of Systems:  Skin:        Eyes:       ENT:  Negative    Respiratory:  Negative    Cardiovascular:  Negative;chest pain;palpitations;cyanosis;fatigue;lightheadedness;dizziness;edema;exercise intolerance Positive for  Gastroenterology:      Genitourinary:       Musculoskeletal:       Neurologic:       Psychiatric:       Heme/Lymph/Imm:       Endocrine:  Negative      Physical Exam:  Vitals: /73   Pulse 64   Ht 1.956 m (6' 5\")   Wt 91.8 kg (202 lb 4.8 oz)   BMI 23.99 kg/m      Constitutional:  cooperative;in no acute distress        Skin:  warm and dry to the touch          Head:  no masses or lesions        Eyes:  pupils equal and round        Lymph:      ENT:  no pallor or cyanosis        Neck:  no carotid bruit        Respiratory:  clear to auscultation;normal symmetry         Cardiac: regular rhythm;no murmurs, gallops or rubs detected                pulses full and equal                                        GI:  abdomen soft        Extremities and Muscular Skeletal:  no deformities, clubbing, cyanosis, erythema observed;no edema              Neurological:  no gross motor deficits        Psych:  Alert and Oriented x 3        Recent Lab Results:  LIPID RESULTS:  Lab Results   Component Value Date    CHOL 165 05/27/2022    CHOL 169 06/21/2019    HDL 35 (L) 05/27/2022    HDL 39 (L) 06/21/2019     (H) 05/27/2022     (H) 06/21/2019    TRIG 101 05/27/2022    TRIG 78 07/14/2020    TRIG 86 06/21/2019       LIVER ENZYME RESULTS:  No results found for: \"AST\", \"ALT\"    CBC RESULTS:  Lab Results   Component Value Date    WBC 7.6 01/02/2018    RBC 5.33 (H) 01/02/2018    HGB 15.7 01/02/2018    HCT 45.9 01/02/2018    MCV 86 01/02/2018    MCH 29.5 01/02/2018    MCHC 34.2 01/02/2018    RDW 12.1 01/02/2018     " "01/02/2018       BMP RESULTS:  Lab Results   Component Value Date     05/27/2022     01/02/2018    POTASSIUM 3.6 05/27/2022    POTASSIUM 3.5 01/02/2018    CHLORIDE 103 05/27/2022    CHLORIDE 101 01/02/2018    CO2 31 05/27/2022    CO2 28 01/02/2018    ANIONGAP 3 05/27/2022    ANIONGAP 7 01/02/2018    GLC 91 05/27/2022     (H) 01/02/2018    BUN 13 05/27/2022    BUN 18 01/02/2018    CR 1.11 05/27/2022    CR 0.87 01/02/2018    GFRESTIMATED >90 05/27/2022    GFRESTIMATED >90 01/02/2018    GFRESTBLACK >90 01/02/2018    JUAN J 9.7 05/27/2022    JUAN J 9.3 01/02/2018        A1C RESULTS:  Lab Results   Component Value Date    A1C 5.4 05/26/2023    A1C 5.1 06/21/2019       INR RESULTS:  No results found for: \"INR\"        CC  No referring provider defined for this encounter.                "

## 2024-01-24 NOTE — LETTER
1/24/2024    Varun Arellano MD  [address] [facility]  [address]  [facility] [address]    RE: Jf Chapman       Dear Colleague,     I had the pleasure of seeing Jf Chapman in the [facility].  HPI and Plan:   Jf Chapman is a 23 year old male who presents with history of Klinefelter's syndrome, pectus excavatum with reconstruction and concerns about mitral valve disease.  He had a pediatric echocardiogram done in 2018 and the reader suggested that there was mild mitral myxomatous changes at that time and had recommended a follow-up echo in 5 years.  He repeated the echocardiogram this past May, and I did review the images myself.  His mitral valve looks completely normal without evidence of mitral valve prolapse, thickening or myxomatous changes.  LV and RV function are normal no other structural abnormalities are seen.  He feels well denying chest pain, shortness of breath, exercise intolerance.  He does have occasional fluttering in his chest he says has been ongoing for a long time and is not bothersome to him.  His mother is present with him and provides much of his history.  I reviewed lab work done in May as well he has a mixed hyperlipidemia with a total cholesterol 165 HDL 35  and triglycerides 101.  Basic metabolic panel, hemoglobin A1c, thyroid studies are all normal.  Exam is unremarkable today    Summary    1.  Klinefelter syndrome without evidence of cardiac manifestations and normal echocardiogram.    2.  Mixed hyperlipidemia-recommend every 2 to 3-year follow-up labs.  Patient does have an increased risk of ischemic heart disease with underlying Klinefelter's.    Please feel free to contact me with any questions given regards to his care       Today's clinic visit entailed:  Review of external notes as documented elsewhere in note  Review of the result(s) of each unique test - echo, lipids, TSH, BMP  The following tests were independently interpreted by me as noted in my  documentation: echo    Provider  Link to MDM Help Grid     The level of medical decision making during this visit was of moderate complexity.    Orders Placed This Encounter   Procedures    EKG 12-lead complete w/read - Clinics (performed today)     Orders Placed This Encounter   Medications    Vitamin D3 (CHOLECALCIFEROL) 25 mcg (1000 units) tablet     Sig: Take by mouth daily    Omega-3 Fatty Acids (FISH OIL) 1200 MG capsule     Sig: Take 1,200 mg by mouth daily as needed     There are no discontinued medications.      Encounter Diagnosis   Name Primary?    Mitral valve disorder Yes       CURRENT MEDICATIONS:  Current Outpatient Medications   Medication Sig Dispense Refill    Omega-3 Fatty Acids (FISH OIL) 1200 MG capsule Take 1,200 mg by mouth daily as needed      Vitamin D3 (CHOLECALCIFEROL) 25 mcg (1000 units) tablet Take by mouth daily      sertraline (ZOLOFT) 50 MG tablet  (Patient not taking: Reported on 1/24/2024)         ALLERGIES   No Known Allergies    PAST MEDICAL HISTORY:  Past Medical History:   Diagnosis Date    Klinefelter syndrome 12/9/2013    Pectus excavatum     Scoliosis        PAST SURGICAL HISTORY:  Past Surgical History:   Procedure Laterality Date    GENITOURINARY SURGERY      circumcision    ORTHOPEDIC SURGERY  2018    Right knee surgery with graft    OSTEOCHONDRAL ALLOGRAFT TRANSFER SYSTEM PROCEDURE KNEE Right 10/17/2019    Procedure: RIGHT KNEE OPEN OSTEOCHONDRAL ALLOGRAFT TRANSPLANT TO LATERAL FEMORAL CONDYLE;  Surgeon: Jose Armando Munoz MD;  Location: [facility] OR    REPAIR PECTUS EXCAVATUM      2015:BIOMET PECTUS BAR MRI CONDITIONAL TO 3T;  bar removed in 2019    wisdom teeth         FAMILY HISTORY:  Family History   Problem Relation Age of Onset    Cerebrovascular Disease Maternal Grandmother     Hypertension Maternal Grandmother     Cerebrovascular Disease Maternal Grandfather     CABG Maternal Grandfather        SOCIAL HISTORY:  Social History     Socioeconomic History    Marital status:  "Single     Spouse name: None    Number of children: None    Years of education: None    Highest education level: None   Tobacco Use    Smoking status: Never    Smokeless tobacco: Never   Substance and Sexual Activity    Alcohol use: Not Currently    Drug use: Never    Sexual activity: Not Currently   Other Topics Concern    Parent/sibling w/ CABG, MI or angioplasty before 65F 55M? No       Review of Systems:  Skin:        Eyes:       ENT:  Negative    Respiratory:  Negative    Cardiovascular:  Negative;chest pain;palpitations;cyanosis;fatigue;lightheadedness;dizziness;edema;exercise intolerance Positive for  Gastroenterology:      Genitourinary:       Musculoskeletal:       Neurologic:       Psychiatric:       Heme/Lymph/Imm:       Endocrine:  Negative      Physical Exam:  Vitals: /73   Pulse 64   Ht 1.956 m (6' 5\")   Wt 91.8 kg (202 lb 4.8 oz)   BMI 23.99 kg/m      Constitutional:  cooperative;in no acute distress        Skin:  warm and dry to the touch          Head:  no masses or lesions        Eyes:  pupils equal and round        Lymph:      ENT:  no pallor or cyanosis        Neck:  no carotid bruit        Respiratory:  clear to auscultation;normal symmetry         Cardiac: regular rhythm;no murmurs, gallops or rubs detected                pulses full and equal                                        GI:  abdomen soft        Extremities and Muscular Skeletal:  no deformities, clubbing, cyanosis, erythema observed;no edema              Neurological:  no gross motor deficits        Psych:  Alert and Oriented x 3        Recent Lab Results:  LIPID RESULTS:  Lab Results   Component Value Date    CHOL 165 05/27/2022    CHOL 169 06/21/2019    HDL 35 (L) 05/27/2022    HDL 39 (L) 06/21/2019     (H) 05/27/2022     (H) 06/21/2019    TRIG 101 05/27/2022    TRIG 78 07/14/2020    TRIG 86 06/21/2019       LIVER ENZYME RESULTS:  No results found for: \"AST\", \"ALT\"    CBC RESULTS:  Lab Results   Component " "Value Date    WBC 7.6 01/02/2018    RBC 5.33 (H) 01/02/2018    HGB 15.7 01/02/2018    HCT 45.9 01/02/2018    MCV 86 01/02/2018    MCH 29.5 01/02/2018    MCHC 34.2 01/02/2018    RDW 12.1 01/02/2018     01/02/2018       BMP RESULTS:  Lab Results   Component Value Date     05/27/2022     01/02/2018    POTASSIUM 3.6 05/27/2022    POTASSIUM 3.5 01/02/2018    CHLORIDE 103 05/27/2022    CHLORIDE 101 01/02/2018    CO2 31 05/27/2022    CO2 28 01/02/2018    ANIONGAP 3 05/27/2022    ANIONGAP 7 01/02/2018    GLC 91 05/27/2022     (H) 01/02/2018    BUN 13 05/27/2022    BUN 18 01/02/2018    CR 1.11 05/27/2022    CR 0.87 01/02/2018    GFRESTIMATED >90 05/27/2022    GFRESTIMATED >90 01/02/2018    GFRESTBLACK >90 01/02/2018    JUAN J 9.7 05/27/2022    JUAN J 9.3 01/02/2018        A1C RESULTS:  Lab Results   Component Value Date    A1C 5.4 05/26/2023    A1C 5.1 06/21/2019       INR RESULTS:  No results found for: \"INR\"        CC  No referring provider defined for this encounter.      Thank you for allowing me to participate in the care of your patient.      Sincerely,     Sue Chambers DO     [facility]  "

## 2024-06-22 ENCOUNTER — HEALTH MAINTENANCE LETTER (OUTPATIENT)
Age: 24
End: 2024-06-22

## 2024-08-26 ENCOUNTER — OFFICE VISIT (OUTPATIENT)
Dept: VASCULAR SURGERY | Facility: CLINIC | Age: 24
End: 2024-08-26
Payer: COMMERCIAL

## 2024-08-26 DIAGNOSIS — I83.811 VARICOSE VEINS OF LEG WITH PAIN, RIGHT: Primary | ICD-10-CM

## 2024-08-26 PROCEDURE — 99203 OFFICE O/P NEW LOW 30 MIN: CPT | Performed by: SURGERY

## 2024-08-26 NOTE — PROGRESS NOTES
[facility]: Jackie Chapman comes today for evaluation of the symptomatic right leg varicose veins.  He started noticing these beginning in his thigh and now works in the calf 3 years ago.  This is progressively increased.  He has fullness and pain after any prolonged sitting or standing over the varicosities.  Initially the thigh was Psorin but now is the thigh and calf.  Occasionally he reaches the point where he starts to limp at the end of the day due to the soreness.  Does not take any chronic medications for this.  No problems at all with the left leg.    Did wear BRIAN stockings for his surgeries but no formal compression for his varicosities    No history of phlebitis, DVT, leading, ulcerations.    PMH: Medications: Vitamin D3, fish oil   Medical: History of pectus excavatum with reconstruction   thoracolumbar scoliosis.    Klinefelter syndrome    Right knee osteochondritis     Surgical: Pectus repair 2016 at [facility]-good result    Bone graft to right knee--2018 and again 2019.  Excellent results    ROS: Good results with surgery particular in the knee.  He [place] [facility] [email or web address] [employer] [place]    Never smoked.  Very active.    FMH: Maternal grandmother with a few varicose veins.  Mother with spider veins.  No venous clotting issues.      5/26/2023 laboratory: A1c= 5.4    LDL= 110    1/26/2024 cardiogram with Dr. Chambers.  EF= 55 to 60%.  No significant valvular issues.  No regional wall abnormalities.  Cardiology appointment at that time was unremarkable    Exam: Alert and appropriate.  Here with his mother.  Very comfortable.   Wears glasses   Height 6 foot 5 inches.  Weight 92 kg   Chest= clear   No obvious visible scoliosis   Extremities= dilated right proximal thigh going to proximal medial calf varicosities up to 5 mm.  Not tender to touch.  Thin legs.  +3 right DP and PT pulse.  Well-developed right ankle GSV.  Left leg is unremarkable with no  varicosities visible or palpable.      Right leg VCSS=2   CEAP: C2    Impression: Progressively increasing symptomatic right thigh and calf varicose veins.  Most likely due to incompetence of the greater saphenous vein.  Doubt deep venous insufficiency.  Patient has not worn compression.  Thigh-high stockings would not work with his activity level and habitus.  He will started wearing a knee-high CEP type athletic stocking daily basis which should improve his calf discomfort but obviously not the thigh discomfort.    Once he is accomplished this movement right leg venous duplex to look at the deep and superficial systems decide on further treatment.    We discussed the possibility of closure of the right GSV the VNUS system which was performed in our office and a light oral sedation and tumescent anesthetic along with cosmetic phlebectomies.  Where there may be some numbness in the greater saphenous nerve depending on the extent of treatment of the GSV.    Over 35 minutes with patient and mother today.  This includes the evaluation of the cardiology testing and past medical history.    [facility] LEG DRAWING:            MD Dyllan De Leon MD

## 2024-08-26 NOTE — LETTER
8/26/2024      Jf Chapman  [address] [address]      Dear Colleague,    Thank you for referring your patient, Jf Chapman, to the [facility] [place]. Please see a copy of my visit note below.     [facility]: Jackie Chapman comes today for evaluation of the symptomatic right leg varicose veins.  He started noticing these beginning in his thigh and now works in the calf 3 years ago.  This is progressively increased.  He has fullness and pain after any prolonged sitting or standing over the varicosities.  Initially the thigh was Psorin but now is the thigh and calf.  Occasionally he reaches the point where he starts to limp at the end of the day due to the soreness.  Does not take any chronic medications for this.  No problems at all with the left leg.    Did wear BRIAN stockings for his surgeries but no formal compression for his varicosities    No history of phlebitis, DVT, leading, ulcerations.    PMH: Medications: Vitamin D3, fish oil   Medical: History of pectus excavatum with reconstruction   thoracolumbar scoliosis.    Klinefelter syndrome    Right knee osteochondritis     Surgical: Pectus repair 2016 at [facility]-good result    Bone graft to right knee--2018 and again 2019.  Excellent results    ROS: Good results with surgery particular in the knee.  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    Never smoked.  Very active.    FMH: Maternal grandmother with a few varicose veins.  Mother with spider veins.  No venous clotting issues.      5/26/2023 laboratory: A1c= 5.4    LDL= 110    1/26/2024 cardiogram with Dr. Chambers.  EF= 55 to 60%.  No significant valvular issues.  No regional wall abnormalities.  Cardiology appointment at that time was unremarkable    Exam: Alert and appropriate.  Here with his mother.  Very comfortable.   Wears glasses   Height 6 foot 5 inches.  Weight 92 kg   Chest= clear   No obvious visible  scoliosis   Extremities= dilated right proximal thigh going to proximal medial calf varicosities up to 5 mm.  Not tender to touch.  Thin legs.  +3 right DP and PT pulse.  Well-developed right ankle GSV.  Left leg is unremarkable with no varicosities visible or palpable.      Right leg VCSS=2   CEAP: C2    Impression: Progressively increasing symptomatic right thigh and calf varicose veins.  Most likely due to incompetence of the greater saphenous vein.  Doubt deep venous insufficiency.  Patient has not worn compression.  Thigh-high stockings would not work with his activity level and habitus.  He will started wearing a knee-high CEP type athletic stocking daily basis which should improve his calf discomfort but obviously not the thigh discomfort.    Once he is accomplished this movement right leg venous duplex to look at the deep and superficial systems decide on further treatment.    We discussed the possibility of closure of the right GSV the VNUS system which was performed in our office and a light oral sedation and tumescent anesthetic along with cosmetic phlebectomies.  Where there may be some numbness in the greater saphenous nerve depending on the extent of treatment of the GSV.    Over 35 minutes with patient and mother today.  This includes the evaluation of the cardiology testing and past medical history.    VEIN CLINIC LEG DRAWING:            MD Dyllan De Leon MD     Again, thank you for allowing me to participate in the care of your patient.        Sincerely,        Dyllan Norman MD

## 2024-08-26 NOTE — NURSING NOTE
Patient Reported symptoms:    Right leg   Heaviness None of the time   Achiness None of the time   Swelling None of the time   Throbbing None of the time   Itching None of the time   Appearance Slightly noticeable   Impact on work/activities Symptoms but full able to participate

## 2024-08-26 NOTE — PATIENT INSTRUCTIONS
Knee High, medical grade, 20-30 mmHg strength, compression stockings are recommended. Dr Norman recommends CEP brand Tall socks 4.0    Options for purchasing compression stockings:    You can call your insurance company and ask if compression stockings are covered.  They usually fall under your Durable Medical Equipment (DME) coverage, if covered. The DME codes if they ask are: Knee high , Thigh high , Panty .   Be sure to ask if you need a specific diagnosis for coverage, if your deductible needs to be met first, how many pairs are covered and how often.  If insurance covers and you would like to order from [facility], or another medical supply company: call the clinic back and we can fax a prescription to your medical supply company of choice. They will bill your insurance and ship them to you. We will ask you color choice (black or beige), and toe choice (open or closed toe).    We sell many sizes in our clinic (except specialty order or petite sizing). We can check to see if we have your size.  Knee high (Mediven brand) are $60/pair  Thigh high (Mediven brand) are $85/pair.   If you would like to purchase from the clinic, let us know including your color choice (black or beige), and toe choice (open or closed toe), and we will set them aside for you to  and pay for at your next clinic appointment or the day of your procedure.    Online website ordering options:   [email or web address]  [email or web address] has many options available.

## 2024-11-29 ENCOUNTER — ANCILLARY PROCEDURE (OUTPATIENT)
Dept: ULTRASOUND IMAGING | Facility: CLINIC | Age: 24
End: 2024-11-29
Attending: SURGERY
Payer: COMMERCIAL

## 2024-11-29 DIAGNOSIS — I83.811 VARICOSE VEINS OF LEG WITH PAIN, RIGHT: ICD-10-CM

## 2024-11-29 PROCEDURE — 93971 EXTREMITY STUDY: CPT | Mod: RT | Performed by: SURGERY

## 2024-12-23 ENCOUNTER — VIRTUAL VISIT (OUTPATIENT)
Dept: VASCULAR SURGERY | Facility: CLINIC | Age: 24
End: 2024-12-23
Payer: COMMERCIAL

## 2024-12-23 DIAGNOSIS — I83.811 VARICOSE VEINS OF LEG WITH PAIN, RIGHT: Primary | ICD-10-CM

## 2024-12-23 PROCEDURE — 99212 OFFICE O/P EST SF 10 MIN: CPT | Mod: 95 | Performed by: SURGERY

## 2024-12-23 PROCEDURE — G2211 COMPLEX E/M VISIT ADD ON: HCPCS | Mod: 95 | Performed by: SURGERY

## 2024-12-23 NOTE — PROGRESS NOTES
Jf is a 24 year old who is being evaluated via a billable video visit.    How would you like to obtain your AVS? MyChart  If the video visit is dropped, the invitation should be resent by: Text to cell phone: [phone]  Will anyone else be joining your video visit? No  Video-Visit Details    Type of service:  Video Visit   Originating Location (pt. Location): Home  Platform used for Video Visit: [employer]  Signed Electronically by: Dyllan Norman MD

## 2024-12-23 NOTE — PROGRESS NOTES
[facility]: Jackie    Jf BLANK Chapman has a video follow-up today.  He was evaluated on 8/26/2024 for symptomatic right leg varicosities that have been present for at least 3 years and progressively increasing in size and discomfort.    Clinically veins were noted starting the right proximal medial groin extending down to the mid medial calf.  Excellent pedal pulses noted.  Left leg was unremarkable with no obvious varicosities.    Recommend he have a trial of compression stockings which she has completed.    11/29/2024 right leg venous duplex completed.  Deep system is unremarkable with no reflux or DVT.  GSV was incompetent from the junction to the proximal calf with a maximal reflux of 4500 ms in diameter of 6.4 mm beyond the junction.  Incompetent LSV from the junction to the mid calf.  No incompetent perforators.    (Note that ultrasound was mislabeled.  Evaluation was primary on the right leg where he has the varicose veins which was labeled as left leg)    VIDEO CONSULTATION: I visited with Jf tiara Cabrera from the vein office to his home.  I sent in images of the venous duplex showing the incompetence of the greater saphenous vein and reflux along with the symptomatic varicosities.    With these findings I do recommend closure of the right GSV throughout the majority of its length (does raise the possibility of temporary or permanent greater saphenous nerve numbness that was discussed) along with cosmetic phlebectomies.    The procedure would be performed in my clinic under light oral sedation with Ativan and clonidine and a tumescent anesthetic.  I discussed the procedure putting the sedation, compression protocol, follow-up with 72-hour duplex, activity level.    Deborah did not work properly went to a phone call.  His mother was on the phone with him.  With the pitchers we sent they have a good understanding of her recommendations for the procedure and would like to proceed but this will need to be  scheduled according with his school schedule.    They are aware that the majority of the portion is covered by insurance but the phlebectomies is cosmetic since he has had no complications and understand this.    Spent a total of 15 minutes on the phone call today being completed at 1600 hrs.      Dyllan Norman MD

## 2024-12-23 NOTE — LETTER
12/23/2024      Jf [place]  [address] [facility] [facility] [address]      Dear Colleague,    Thank you for referring your patient, Jf Chapman, to the [facility] [place]. Please see a copy of my visit note below.     [facility]: Elizabethnolan Chapman has a video follow-up today.  He was evaluated on 8/26/2024 for symptomatic right leg varicosities that have been present for at least 3 years and progressively increasing in size and discomfort.    Clinically veins were noted starting the right proximal medial groin extending down to the mid medial calf.  Excellent pedal pulses noted.  Left leg was unremarkable with no obvious varicosities.    Recommend he have a trial of compression stockings which she has completed.    11/29/2024 right leg venous duplex completed.  Deep system is unremarkable with no reflux or DVT.  GSV was incompetent from the junction to the proximal calf with a maximal reflux of 4500 ms in diameter of 6.4 mm beyond the junction.  Incompetent LSV from the junction to the mid calf.  No incompetent perforators.    (Note that ultrasound was mislabeled.  Evaluation was primary on the right leg where he has the varicose veins which was labeled as left leg)    VIDEO CONSULTATION: I visited with Jf tiara Cabrera from the vein office to his home.  I sent in images of the venous duplex showing the incompetence of the greater saphenous vein and reflux along with the symptomatic varicosities.    With these findings I do recommend closure of the right GSV throughout the majority of its length (does raise the possibility of temporary or permanent greater saphenous nerve numbness that was discussed) along with cosmetic phlebectomies.    The procedure would be performed in my clinic under light oral sedation with Ativan and clonidine and a tumescent anesthetic.  I discussed the procedure putting the sedation, compression protocol, follow-up with 72-hour duplex, activity  level.    Sarmadjorgevera did not work properly went to a phone call.  His mother was on the phone with him.  With the pitchers we sent they have a good understanding of her recommendations for the procedure and would like to proceed but this will need to be scheduled according with his school schedule.    They are aware that the majority of the portion is covered by insurance but the phlebectomies is cosmetic since he has had no complications and understand this.    Spent a total of 15 minutes on the phone call today being completed at 1600 hrs.      Dyllan Norman MD     Jf is a 24 year old who is being evaluated via a billable video visit.    How would you like to obtain your AVS? MyChart  If the video visit is dropped, the invitation should be resent by: Text to cell phone: [phone]  Will anyone else be joining your video visit? No  Video-Visit Details    Type of service:  Video Visit   Originating Location (pt. Location): Home  Platform [facility] for [facility] Visit: [facility]  Signed Electronically by: Dyllan Norman MD        Again, thank you for allowing me to participate in the care of your patient.        Sincerely,        Dyllan Norman MD    Electronically signed

## 2024-12-26 NOTE — PATIENT INSTRUCTIONS
Deepa Monahan, Surgery Scheduler - [phone].    Procedure Plan: 1. Right leg VNUS closure GSV and 1 unit Phlebectomies (cosmetic)    Please call our office regarding the status of your insurance authorization if it has been more than 3 weeks and you have not heard from our surgery scheduler.        Pre-Procedure Instructions:                           VNUS Closure and Phlebectomies   You are having a Closure(s) - where one or more veins are closed and Phlebectomies - where one or more veins are removed.   Insurance  Precertification and/or referral authorization may be required by your insurance company.  We will call your insurance company to verify benefits for the medically necessary part of your procedure.    1 unit cosmetic phlebectomies - $789.00    Your Current Medications and Allergies  To reduce bruising, please do not take aspirin-type medications (Motrin, Aleve, Ibuprofen, Advil, etc.) for three days before your procedure. You may take Tylenol if you need a pain reliever.  Are you on blood thinner medications? (Plavix, Coumadin, Eliquis, Xarelto) Please discuss this with your surgeon. You may resume taking your blood thinner medication after your procedure.  Are you sensitive to latex or adhesives used for fake fingernails? Please let us know!    Driving Escort and   Please arrange to have a trusted adult (18 years old or older) drive you to and from the clinic.  For your safety, we recommend you have a trusted adult to stay with you until the next morning.    Your Health  If you have a change in your health before the procedure, contact our office immediately. (For example: cold symptoms, cough, urinary tract infection, fever, flu symptoms.)  A pre-procedure physical is not required.    Note  It is sometimes necessary to adjust the procedure schedule due to emergencies. We greatly appreciate your flexibility and understanding in this matter.                  Check List: The Morning of Your  Procedure  ___1. Please do not put anything on your leg(s) or shave the day of your procedure.  ___2. You may take your normal medications the day of your procedure.  ___3. It is recommended you eat a light breakfast or lunch the day of your procedure.  ___4. Wear comfortable loose-fitting clothing and wide-fitting shoes (i.e. tennis shoes, slip-ons).  ___5. Please arrive at our clinic at the specified time given by the nurse.  ___6. You will sign an affirmation of informed consent.  ___7. Bring your pre-procedure sedation medication (lorazepam and clonidine) with you to the clinic.              One hour before your procedure, you will be instructed to take these medications. The lorazepam              (Ativan) lowers anxiety and sedates you; the clonidine makes the lorazepam more effective. Everyone's              body processes these medications differently. Therefore, reactions to these medications vary. Some              people stay awake and some people sleep through the whole procedure. You may not remember              everything about the procedure or the day. You do not want to make any big decisions for the rest of the              day.    The Day of Your Procedure:       VNUS Closure and Phlebectomies  In the Exam Room  A nurse will bring you back to an exam room with your family member or friend. This is when your informed consent will be signed, and you will take your pre-procedure medications.  You will be asked to remove everything from the waist down, including undergarments. You will then put on a hospital gown or shorts and blue booties.  Your surgeon will come in to answer any questions and kellie any bulging varicose veins to be removed.  You will be taken to the restroom to empty your bladder before going into the procedure room.    In the Procedure Room  You will be escorted to the procedure room. You will lie on a procedure table covered with a sheet or blanket.  A nurse will put a blood  pressure cuff on your arm and a pulse/oxygen monitor on a finger. Your vital signs will be monitored every 15 minutes.  Your gown will be pulled up slightly and the groin exposed for a short period of time. The surgeon's assistant will clean your foot, leg, and groin with an antibacterial solution. We will get you covered up as quickly as possible!  Sterile towels and blue drapes will be used to cover you and the table. You will be asked to keep your hands under the blue drapes during the procedure.  The lights will be turned down. The table will be tipped so your head is higher than your feet. You may feel like you're going to slide off, but you won't.    The Procedure  The surgeon will visualize your veins with an ultrasound machine. He or she will then numb your skin and access the vein. A catheter is passed up the vein and positioned with ultrasound guidance. The table will then be tipped head down.  Once the catheter is in the correct position, medication will be injected to numb your leg. You will feel some needle sticks and may feel discomfort as the medication goes in. Once this is done, you should not experience significant discomfort. But if you do, please let us know and more numbing medication can be injected. As the catheter sends out heat, the vein closes off and the catheter is withdrawn.  For the phlebectomy part of the procedure, small incisions are made where the bulging varicose veins have been marked on your leg(s) and these veins will be removed using a small sadia hook instrument.    Post-Procedure  Once the procedure is done, your leg(s) will be washed with warm water and dried. Your leg(s) will be bandaged with large soft dressings and a large ACE bandage wrapped from toes to groin.   You will be offered something to drink and a light snack.  You will rest with your leg(s) elevated for approximately 30 minutes. Your friend or family member may join you.  For your safety, you will be taken  to your car in a wheelchair. If you are able to, it is good to keep your leg(s) elevated on the car ride home.    Post-Procedure Instructions:             VNUS Closure and Phlebectomies      Post-Op Day Zero - The Day of Your Procedure:0  1. Medication for Pain Control and Inflammation Control   - The numbing medication injected during your procedure will last for several hours. The pre-procedure                 tablets may make you very sleepy and you might not remember everything from the procedure or from                 the day. This will usually wear off by the next day.   - Ibuprofen:  If tolerated, take ibuprofen (e.g., Advil) to reduce inflammation whether or not you have                 pain. For three days, take two tablets (200 mg each) with every meal and at bedtime with a snack. If                 your pain is not controlled with ibuprofen, you may take prescription pain medication (such as Norco),                 if prescribed.   - You may resume taking any medications you were taking before your procedure.  2. Activity   - Rest with your leg(s) elevated above your heart. This will prevent from a lot of swelling and                 bleeding. You do not need to elevate your leg(s) while sleeping at night. You may go upstairs, sit up to                 eat, use the bathroom, and take several five-minute walks. Otherwise, keep your leg(s) elevated.                 Minimize the amount of time you are up on your feet to about 30 minutes at a time.  3. Bandages   - The incision sites will be covered with soft bandages and an ACE wrap. Keep your bandages on                 and dry for 48 hours. The ACE should provide  snug  compression but should not cause pain or                 numbness in the toes. If you have significant discomfort or your toes become cold or numb, unwrap                 your ACE and rewrap with less tension starting at the toes wrapping upward.  4. Incisions   - Bleeding: You may see  some incision sites that are oozing through the bandages. This is not unusual      and can be managed with Rest, Ice, Compression and Elevation (RICE). Apply ice and firm pressure      directly to the site that is bleeding and rest with your leg(s) elevated above your heart for 20-30                 minutes.    Post-Op Day One:  1. Medication   - Ibuprofen: Continue the same as the Day of Your Procedure. If your pain is not controlled with                 ibuprofen, you may take prescription pain medication (such as Norco), if prescribed.   2. Activity   - We would like you to get up at least six times and walk around for short periods of time, unless it is      causing you pain. You should not be on your feet more than 90 minutes at a time. Elevate your leg      above your heart when you are not walking.  3. Bandages   - Your bandages must be kept on and dry for 48 hours.  4. Driving   - You may resume driving when you can do so safely. Do not drive if you are taking narcotic pain      medication.  Post-Op Day Two:   1. Medication  - Ibuprofen: Continue the same as the Day of Your Procedure.  2.  Activity  - Walk as tolerated. Elevate as much as possible when not walking.  3. Bandages and Compression  - Remove ACE wrap and padding. Shower and put on your compression hose during waking hours only       for at least 5 days. (Your doctor may instruct you to keep your bandages on until your return     appointment; please follow your doctor's instructions.)  4. Incisions  - Your leg(s) will be bruised; there may be swelling, hard knots under the skin and possibly some     numbness. These will likely resolve over time. If you see  hair-like  strings coming out of your     incisions, do not pull them (this will only cause pain/discomfort). We will trim them when you come     back for your follow-up appointment.  5. Call Us If:   - You see any areas on your leg that are red and angry in appearance.   - You notice any  drainage that is milky or cloudy in appearance or that has a foul odor.   - You run a temperature of 100.5 or greater.    Post-Op Day Three:  You will have a follow up appointment 2-4 days post-procedure. At this appointment, you will have an ultrasound and we will check your incisions.    ________________________________________________________________________________________    The Two Weeks Following Your Procedure  1.  Skin Care   - Do not use any lotions, creams or powders on your incisions for 14 days or until the incisions have                 healed.   - Do not soak in a bathtub, hot tub or go swimming for 14 days or until your incisions have healed.  2.  Medications   - You may use ibuprofen or acetaminophen (e.g., Tylenol) as needed for pain or discomfort.  3.  Activity   - Do not lift over 25 pounds. After about two weeks you may resume exercise such as aerobics,                 running, tennis or weightlifting. Use your common sense and ease back into your exercise routine                 slowly.   - You may feel a cord-like tightness along the inside of your leg. Gentle stretching can be helpful.  4. Compression Hose   - Your doctor may instruct you to wear compression for longer than seven days; please follow your                 doctor's instructions. As a comfort measure, you may choose to wear compression for longer than                 required.  5.  Travel   - Do not fly in an airplane for 14 days after your procedure. If you have a long car trip planned within                 two to three weeks following your procedure, stop and walk for a few minutes every two hours.      Periodic ankle pumps during the ride may be helpful.    Six Week Appointment  - At your six-week appointment, you will see your surgeon for an exam and evaluation. This office visit     will be scheduled when you return for post-op day three return appointment.       Return to Work  1.  If you work outside the home, you may  return to work in a few days depending on the extent of your        procedure, how you tolerate it, and the type of work you perform.  2.  Paperwork: If your employer requires paperwork or you would like a letter written to your employer, please        let us know. We will complete disability type forms at no charge. Please allow five business days for forms        to be completed.

## 2024-12-26 NOTE — PROGRESS NOTES
December 26, 2024    Vein Procedure Recommendation    Called and left voicemail for patient.    Dr. Norman has recommended patient to have the following vein procedure(s):     1. Right leg VNUS closure GSV and 1 unit Phlebectomies (cosmetic) ($789.00)    Patient Pre-op Questions:  Preferred Pharmacy: [facility]  Anticoagulant/ASA: No  Artificial Joint or Heart Valve:  No  Open ulcer:  No  Sedation nausea: No    Patient is recommended to wear Thigh High compression hose following his procedure. Discussed compression hose. Explained to patient if insurance doesn't cover the compression hose there are a couple different options to getting them and to call the clinic to let us know if they need help.    Entered in patient's After Visit Summary (viewable in [employer]) written procedure instructions to review on his own (see After Visit Summary).    Next steps:    Insurance Submission  Informed patient this process could take up to 14 business days, but once approved, the patient will be contacted by our surgery scheduler to schedule the above procedure. Gave patient our surgery scheduler's information.    Informed patient to call our office regarding the status of their insurance authorization if it has been more than 3 weeks and have not heard from our surgery scheduler.    Gave patient our call back number if any further questions or concerns.    Angelita Waddell RN  [facility]  Vein Clinic

## 2025-01-22 DIAGNOSIS — I83.811 VARICOSE VEINS OF LEG WITH PAIN, RIGHT: Primary | ICD-10-CM

## 2025-03-06 DIAGNOSIS — I83.811 VARICOSE VEINS OF LEG WITH PAIN, RIGHT: Primary | ICD-10-CM

## 2025-03-06 RX ORDER — CLONIDINE HYDROCHLORIDE 0.1 MG/1
TABLET ORAL
Qty: 1 TABLET | Refills: 0 | Status: SHIPPED | OUTPATIENT
Start: 2025-03-06

## 2025-03-06 RX ORDER — LORAZEPAM 1 MG/1
TABLET ORAL
Qty: 3 TABLET | Refills: 0 | Status: SHIPPED | OUTPATIENT
Start: 2025-03-06

## 2025-03-15 ENCOUNTER — TELEPHONE (OUTPATIENT)
Dept: OTHER | Facility: CLINIC | Age: 25
End: 2025-03-15
Payer: COMMERCIAL

## 2025-03-15 NOTE — TELEPHONE ENCOUNTER
[facility]: Jackie      I called Jf Chapman about his vein surgery yesterday.  I left a message.  Follow-up in Clinic on Monday    MD Jf De Leon called back--he is doing well.      WRO

## 2025-03-17 ENCOUNTER — ANCILLARY PROCEDURE (OUTPATIENT)
Dept: ULTRASOUND IMAGING | Facility: CLINIC | Age: 25
End: 2025-03-17
Attending: SURGERY
Payer: COMMERCIAL

## 2025-03-17 ENCOUNTER — ALLIED HEALTH/NURSE VISIT (OUTPATIENT)
Dept: VASCULAR SURGERY | Facility: CLINIC | Age: 25
End: 2025-03-17
Attending: SURGERY
Payer: COMMERCIAL

## 2025-03-17 DIAGNOSIS — Z09 POSTOP CHECK: Primary | ICD-10-CM

## 2025-03-17 DIAGNOSIS — I83.811 VARICOSE VEINS OF LEG WITH PAIN, RIGHT: ICD-10-CM

## 2025-03-17 PROCEDURE — 99207 PR NO CHARGE NURSE ONLY: CPT

## 2025-03-17 PROCEDURE — 93971 EXTREMITY STUDY: CPT | Mod: RT | Performed by: STUDENT IN AN ORGANIZED HEALTH CARE EDUCATION/TRAINING PROGRAM

## 2025-03-17 NOTE — PROGRESS NOTES
March 17, 2025    Vein Clinic Postoperative Nurse Note    Patient is here for his 72 hour postoperative visit.    Procedure: Right leg VNUS closure GSV(med nec), 1 unit phlebs($)   Procedure Date: 3/14/25  Surgeon: Dr. Norman    Ultrasound Result: The right GSV is closed 12.6 mm from the SFJ to the MID CALF calf with evidence of thrombus seen throughout except for a short segment in the distal thigh. No evidence of RLE DVT.    Physical Exam: Incisions are approximated without signs of infection.  Ecchymosis: minimal (11 phlebs to right proximal medial calf)  Swelling: minimal  Paresthesia: pt stated he does have numbness to his right medial ankle (near access site)    Patient Questions or Concerns: Overall, pt is doing well and has no concerns at this time.    Pt is able to don his thigh high compression hose.    Reviewed postoperative instructions with patient and provided him with written material of common things to expect from his procedure.    Patient's Next Vein Clinic Appointment: 6 week post op with  (4/28/25).    Angelita Waddell RN  [facility]

## 2025-05-19 ENCOUNTER — OFFICE VISIT (OUTPATIENT)
Dept: VASCULAR SURGERY | Facility: CLINIC | Age: 25
End: 2025-05-19
Payer: COMMERCIAL

## 2025-05-19 DIAGNOSIS — I83.811 VARICOSE VEINS OF LEG WITH PAIN, RIGHT: Primary | ICD-10-CM

## 2025-05-19 PROCEDURE — 99207 PR VEINSOLUTIONS POST OPERATIVE VISIT: CPT | Performed by: SURGERY

## 2025-05-19 NOTE — LETTER
5/19/2025      Jf Chapman  [id number] [place] [id number]      Dear Colleague,    Thank you for referring your patient, Jf Chapman, to the [facility] [place]. Please see a copy of my visit note below.     [facility]: Jackie    Jf Chapman turns for 6-week follow-up.    -- 3/14/2025: Ablation right GSV from junction to ankle with cosmetic  calf #11 phlebectomies   72-hour follow-up: GSV closed 12.6 mm from junction to mid calf.  Minimal ecchymosis at phlebectomy sites.  Some numbness that access site in the right medial ankle.    Overall he is doing better.  Still a palpable cord with some tightness in the proximal one quarter to the medial thigh along the GSV closure.  The numbness in his ankle is now decreasing and located several centimeters above the access site.  Only occasionally wears compression.    Exam: Alert and appropriate.  Well-healing phlebectomy sites.  No swelling.   Very distal GSN numbness with no hypersensitivity--improved   No recurrent varicosities   Palpable cord in proximal thigh which is also decreasing.      IMPRESSION: Overall doing very well.  Follow-up duplex in 6 months.  Compression as needed.      Dyllan Norman MD       Again, thank you for allowing me to participate in the care of your patient.        Sincerely,        Dyllan Norman MD    Electronically signed

## 2025-05-19 NOTE — PROGRESS NOTES
[facility]: Jackie Chapman turns for 6-week follow-up.    -- 3/14/2025: Ablation right GSV from junction to ankle with cosmetic  calf #11 phlebectomies   72-hour follow-up: GSV closed 12.6 mm from junction to mid calf.  Minimal ecchymosis at phlebectomy sites.  Some numbness that access site in the right medial ankle.    Overall he is doing better.  Still a palpable cord with some tightness in the proximal one quarter to the medial thigh along the GSV closure.  The numbness in his ankle is now decreasing and located several centimeters above the access site.  Only occasionally wears compression.    Exam: Alert and appropriate.  Well-healing phlebectomy sites.  No swelling.   Very distal GSN numbness with no hypersensitivity--improved   No recurrent varicosities   Palpable cord in proximal thigh which is also decreasing.      IMPRESSION: Overall doing very well.  Follow-up duplex in 6 months.  Compression as needed.      Dyllan Norman MD

## (undated) DEVICE — GLOVE PROTEXIS BLUE W/NEU-THERA 6.5  2D73EB65

## (undated) DEVICE — PACK TOTAL KNEE SOP15TKFSD

## (undated) DEVICE — SOL NACL 0.9% IRRIG 3000ML BAG 2B7477

## (undated) DEVICE — SU ORTHOCORD 2 OS-6 36"  223103

## (undated) DEVICE — SU VICRYL 2-0 CT-2 27" UND J269H

## (undated) DEVICE — IMM KNEE 20" 0814-2660

## (undated) DEVICE — GOWN IMPERVIOUS SPECIALTY XL/XLONG 39049

## (undated) DEVICE — GLOVE PROTEXIS MICRO 6.5  2D73PM65

## (undated) DEVICE — PREP CHLORAPREP 26ML TINTED ORANGE  260815

## (undated) DEVICE — DRAPE STERI U 1015

## (undated) DEVICE — LINEN TOWEL PACK X5 5464

## (undated) DEVICE — SOL WATER IRRIG 1000ML BOTTLE 2F7114

## (undated) DEVICE — SUCTION IRR SYSTEM W/O TIP INTERPULSE HANDPIECE 0210-100-000

## (undated) DEVICE — SOL NACL 0.9% IRRIG 1000ML BOTTLE 2F7124

## (undated) DEVICE — SU MONOCRYL 3-0 PS-2 27" Y427H

## (undated) DEVICE — GLOVE PROTEXIS BLUE W/NEU-THERA 8.5  2D73EB85

## (undated) DEVICE — DRSG STERI STRIP 1/2X4" R1547

## (undated) DEVICE — BONE CLEANING TIP INTERPULSE  0210-010-000

## (undated) DEVICE — PEN MARKING SKIN FINE 31145942

## (undated) DEVICE — BLADE SAW SAGITTAL STRK 20.7X85X0.89MM 2108-109-000S13

## (undated) DEVICE — GLOVE PROTEXIS POWDER FREE 8.5 ORTHOPEDIC 2D73ET85

## (undated) RX ORDER — PROPOFOL 10 MG/ML
INJECTION, EMULSION INTRAVENOUS
Status: DISPENSED
Start: 2019-10-17

## (undated) RX ORDER — DEXAMETHASONE SODIUM PHOSPHATE 4 MG/ML
INJECTION, SOLUTION INTRA-ARTICULAR; INTRALESIONAL; INTRAMUSCULAR; INTRAVENOUS; SOFT TISSUE
Status: DISPENSED
Start: 2019-10-17

## (undated) RX ORDER — LIDOCAINE HYDROCHLORIDE 20 MG/ML
INJECTION, SOLUTION EPIDURAL; INFILTRATION; INTRACAUDAL; PERINEURAL
Status: DISPENSED
Start: 2019-10-17

## (undated) RX ORDER — ONDANSETRON 2 MG/ML
INJECTION INTRAMUSCULAR; INTRAVENOUS
Status: DISPENSED
Start: 2019-10-17

## (undated) RX ORDER — MEPERIDINE HYDROCHLORIDE 25 MG/ML
INJECTION INTRAMUSCULAR; INTRAVENOUS; SUBCUTANEOUS
Status: DISPENSED
Start: 2019-10-17

## (undated) RX ORDER — FENTANYL CITRATE 0.05 MG/ML
INJECTION, SOLUTION INTRAMUSCULAR; INTRAVENOUS
Status: DISPENSED
Start: 2019-10-17

## (undated) RX ORDER — CEFAZOLIN SODIUM 2 G/100ML
INJECTION, SOLUTION INTRAVENOUS
Status: DISPENSED
Start: 2019-10-17

## (undated) RX ORDER — HYDROMORPHONE HYDROCHLORIDE 1 MG/ML
INJECTION, SOLUTION INTRAMUSCULAR; INTRAVENOUS; SUBCUTANEOUS
Status: DISPENSED
Start: 2019-10-17

## (undated) RX ORDER — HYDROXYZINE HYDROCHLORIDE 25 MG/1
TABLET, FILM COATED ORAL
Status: DISPENSED
Start: 2019-10-17

## (undated) RX ORDER — FENTANYL CITRATE 50 UG/ML
INJECTION, SOLUTION INTRAMUSCULAR; INTRAVENOUS
Status: DISPENSED
Start: 2019-10-17

## (undated) RX ORDER — ACETAMINOPHEN 325 MG/1
TABLET ORAL
Status: DISPENSED
Start: 2019-10-17

## (undated) RX ORDER — OXYCODONE HYDROCHLORIDE 5 MG/1
TABLET ORAL
Status: DISPENSED
Start: 2019-10-17